# Patient Record
Sex: FEMALE | Race: WHITE | NOT HISPANIC OR LATINO | Employment: UNEMPLOYED | URBAN - METROPOLITAN AREA
[De-identification: names, ages, dates, MRNs, and addresses within clinical notes are randomized per-mention and may not be internally consistent; named-entity substitution may affect disease eponyms.]

---

## 2018-04-19 ENCOUNTER — OFFICE VISIT (OUTPATIENT)
Dept: INTERNAL MEDICINE | Facility: CLINIC | Age: 33
End: 2018-04-19
Payer: COMMERCIAL

## 2018-04-19 ENCOUNTER — LAB VISIT (OUTPATIENT)
Dept: LAB | Facility: HOSPITAL | Age: 33
End: 2018-04-19
Attending: INTERNAL MEDICINE
Payer: COMMERCIAL

## 2018-04-19 VITALS
BODY MASS INDEX: 47.72 KG/M2 | HEIGHT: 58 IN | TEMPERATURE: 97 F | OXYGEN SATURATION: 98 % | WEIGHT: 227.31 LBS | SYSTOLIC BLOOD PRESSURE: 122 MMHG | DIASTOLIC BLOOD PRESSURE: 74 MMHG | HEART RATE: 88 BPM

## 2018-04-19 DIAGNOSIS — E66.01 SEVERE OBESITY (BMI >= 40): ICD-10-CM

## 2018-04-19 DIAGNOSIS — E07.9 THYROID DYSFUNCTION: ICD-10-CM

## 2018-04-19 DIAGNOSIS — E88.819 INSULIN RESISTANCE: ICD-10-CM

## 2018-04-19 DIAGNOSIS — E55.9 VITAMIN D DEFICIENCY: ICD-10-CM

## 2018-04-19 DIAGNOSIS — E88.819 INSULIN RESISTANCE: Primary | ICD-10-CM

## 2018-04-19 LAB
25(OH)D3+25(OH)D2 SERPL-MCNC: 17 NG/ML
ALBUMIN SERPL BCP-MCNC: 3.7 G/DL
ALP SERPL-CCNC: 73 U/L
ALT SERPL W/O P-5'-P-CCNC: 55 U/L
ANION GAP SERPL CALC-SCNC: 15 MMOL/L
AST SERPL-CCNC: 40 U/L
BASOPHILS # BLD AUTO: 0.06 K/UL
BASOPHILS NFR BLD: 0.6 %
BILIRUB SERPL-MCNC: 0.3 MG/DL
BUN SERPL-MCNC: 11 MG/DL
CALCIUM SERPL-MCNC: 10 MG/DL
CHLORIDE SERPL-SCNC: 103 MMOL/L
CHOLEST SERPL-MCNC: 154 MG/DL
CHOLEST/HDLC SERPL: 3.9 {RATIO}
CO2 SERPL-SCNC: 22 MMOL/L
CREAT SERPL-MCNC: 0.7 MG/DL
DIFFERENTIAL METHOD: ABNORMAL
EOSINOPHIL # BLD AUTO: 0.1 K/UL
EOSINOPHIL NFR BLD: 1.2 %
ERYTHROCYTE [DISTWIDTH] IN BLOOD BY AUTOMATED COUNT: 15.1 %
EST. GFR  (AFRICAN AMERICAN): >60 ML/MIN/1.73 M^2
EST. GFR  (NON AFRICAN AMERICAN): >60 ML/MIN/1.73 M^2
ESTIMATED AVG GLUCOSE: 111 MG/DL
GLUCOSE SERPL-MCNC: 121 MG/DL
HBA1C MFR BLD HPLC: 5.5 %
HCT VFR BLD AUTO: 43 %
HDLC SERPL-MCNC: 39 MG/DL
HDLC SERPL: 25.3 %
HGB BLD-MCNC: 13 G/DL
IMM GRANULOCYTES # BLD AUTO: 0.16 K/UL
IMM GRANULOCYTES NFR BLD AUTO: 1.5 %
LDLC SERPL CALC-MCNC: 39.4 MG/DL
LYMPHOCYTES # BLD AUTO: 3 K/UL
LYMPHOCYTES NFR BLD: 28.5 %
MCH RBC QN AUTO: 26.5 PG
MCHC RBC AUTO-ENTMCNC: 30.2 G/DL
MCV RBC AUTO: 88 FL
MONOCYTES # BLD AUTO: 0.7 K/UL
MONOCYTES NFR BLD: 6.8 %
NEUTROPHILS # BLD AUTO: 6.4 K/UL
NEUTROPHILS NFR BLD: 61.4 %
NONHDLC SERPL-MCNC: 115 MG/DL
NRBC BLD-RTO: 0 /100 WBC
PLATELET # BLD AUTO: 324 K/UL
PMV BLD AUTO: 10.7 FL
POTASSIUM SERPL-SCNC: 4.1 MMOL/L
PROT SERPL-MCNC: 7.1 G/DL
RBC # BLD AUTO: 4.9 M/UL
SODIUM SERPL-SCNC: 140 MMOL/L
T4 FREE SERPL-MCNC: 0.69 NG/DL
TRIGL SERPL-MCNC: 378 MG/DL
TSH SERPL DL<=0.005 MIU/L-ACNC: 1.18 UIU/ML
WBC # BLD AUTO: 10.38 K/UL

## 2018-04-19 PROCEDURE — 82306 VITAMIN D 25 HYDROXY: CPT

## 2018-04-19 PROCEDURE — 99999 PR PBB SHADOW E&M-EST. PATIENT-LVL III: CPT | Mod: PBBFAC,,, | Performed by: INTERNAL MEDICINE

## 2018-04-19 PROCEDURE — 80053 COMPREHEN METABOLIC PANEL: CPT

## 2018-04-19 PROCEDURE — 99214 OFFICE O/P EST MOD 30 MIN: CPT | Mod: S$GLB,,, | Performed by: INTERNAL MEDICINE

## 2018-04-19 PROCEDURE — 36415 COLL VENOUS BLD VENIPUNCTURE: CPT | Mod: PO

## 2018-04-19 PROCEDURE — 83036 HEMOGLOBIN GLYCOSYLATED A1C: CPT

## 2018-04-19 PROCEDURE — 85025 COMPLETE CBC W/AUTO DIFF WBC: CPT

## 2018-04-19 PROCEDURE — 84439 ASSAY OF FREE THYROXINE: CPT

## 2018-04-19 PROCEDURE — 80061 LIPID PANEL: CPT

## 2018-04-19 PROCEDURE — 84443 ASSAY THYROID STIM HORMONE: CPT

## 2018-04-19 NOTE — PROGRESS NOTES
Subjective:      Patient ID: Shantelle Blount is a 32 y.o. female.    Chief Complaint: Establish Care ([facility])      Ms. Shantelle Blount is a patient of Mathieu Dalal MD, who presents to establish primary care.    HPI  She reports having abnormal thyroid function tests from [facility] as of yesterday, for which she was recommended to f/u with her PCP.     She otherwise reports feeling well.     Past Medical History:   Diagnosis Date    Anxiety     Bipolar 2 disorder     Borderline personality disorder     Depression     Insomnia     Insulin resistance      History reviewed. No pertinent surgical history.  Social History     Social History    Marital status:      Spouse name: N/A    Number of children: N/A    Years of education: N/A     Occupational History    Not on file.     Social History Main Topics    Smoking status: Never Smoker    Smokeless tobacco: Never Used    Alcohol use 0.0 - 1.2 oz/week      Comment: 1-2 drinks 1x/mo    Drug use: No    Sexual activity: Yes     Partners: Male     Birth control/ protection: IUD      Comment:      Other Topics Concern    Not on file     Social History Narrative    Patient goal(s): Trying to lose weight in general; She notes being 100 lbs in 2009 and gained weight since.        Breakfast: 28%: Fruit smoothies    Lunch: 50%: Fruit or salads: greens, onions, no dress; water or lemonade    Dinner: Crawfish or protein (not red meat); water or lemonade    Snacks: Citrus fruit: apples or cashews with non-dairy yogurt    Eating out: 2-4x/mo    Water: <32 oz/day    Physical Activity: Walks 1 mi/day (60 minutes/day; 7x/wk)         Goals     None        Family History   Problem Relation Age of Onset    Hypertension Mother     Hypertension Father     Stroke Father     Breast cancer Neg Hx     Colon cancer Neg Hx     Ovarian cancer Neg Hx        Current Outpatient Prescriptions:     metformin (GLUCOPHAGE) 1000 MG tablet, Take  "1,000 mg by mouth 2 (two) times daily with meals., Disp: , Rfl:     quetiapine (SEROQUEL) 100 MG Tab, Take 100 mg by mouth., Disp: , Rfl:     lamotrigine (LAMICTAL) 150 MG Tab, Take 150 mg by mouth once daily., Disp: , Rfl:     lithium (ESKALITH) 450 MG TbSR, Take 450 mg by mouth every 12 (twelve) hours., Disp: , Rfl:     lurasidone (LATUDA) 20 mg Tab, Take by mouth., Disp: , Rfl:     Review of patient's allergies indicates:   Allergen Reactions    Dilaudid [hydromorphone (bulk)] Rash    Percocet [oxycodone-acetaminophen] Nausea And Vomiting, Swelling and Rash    Septra [sulfamethoxazole-trimethoprim] Rash       Review of Systems   Negative    Objective:     /74 (BP Location: Left arm, Patient Position: Sitting, BP Method: Large (Automatic))   Pulse 88   Temp 96.8 °F (36 °C) (Tympanic)   Ht 4' 10" (1.473 m)   Wt 103.1 kg (227 lb 4.7 oz)   LMP 03/11/2018 (Exact Date)   SpO2 98%   BMI 47.50 kg/m²     Physical Exam  GEN: A&O fully, NAD  PSYC: Normal affect      Assessment:      1. Insulin resistance: Will check A1c.   2. Severe obesity (BMI >= 40): Discussed strategies for lifestyle modifications, including systematically increasing physical activity, water; and avoiding potatoes, sugar sweetened beverages, red/processed meats, fast food, grains and tomatoes; and increasing yogurt, whole fruits, unsalted nuts, non-starchy vegetables, cooked beans, and weight logging.   3. Vitamin D deficiency: Will check.   4. Thyroid dysfunction: Will check TFTs.       Plan:   Insulin resistance  -     Hemoglobin A1c; Future; Expected date: 04/19/2018    Severe obesity (BMI >= 40)  -     CBC auto differential; Future; Expected date: 04/19/2018  -     Comprehensive metabolic panel; Future; Expected date: 04/19/2018  -     Lipid panel; Future; Expected date: 04/19/2018    Vitamin D deficiency  -     Vitamin D; Future; Expected date: 04/19/2018    Thyroid dysfunction  -     TSH; Future; Expected date: 04/19/2018  -    "  T4, free; Future; Expected date: 04/19/2018        Follow-up in about 3 months (around 7/19/2018), or if symptoms worsen or fail to improve, for FU on obesity.

## 2018-07-05 ENCOUNTER — PATIENT OUTREACH (OUTPATIENT)
Dept: ADMINISTRATIVE | Facility: HOSPITAL | Age: 33
End: 2018-07-05

## 2019-10-29 PROBLEM — F51.01 PRIMARY INSOMNIA: Status: ACTIVE | Noted: 2019-10-29

## 2019-11-22 PROBLEM — Z20.828 EXPOSURE TO INFLUENZA: Status: ACTIVE | Noted: 2019-11-22

## 2019-11-22 PROBLEM — J40 BRONCHITIS: Status: ACTIVE | Noted: 2019-11-22

## 2019-12-21 ENCOUNTER — NURSE TRIAGE (OUTPATIENT)
Dept: ADMINISTRATIVE | Facility: CLINIC | Age: 34
End: 2019-12-21

## 2019-12-22 NOTE — TELEPHONE ENCOUNTER
Reason for Disposition   [1] Constant abdominal pain AND [2] present > 2 hours    Additional Information   Negative: Shock suspected (e.g., cold/pale/clammy skin, too weak to stand, low BP, rapid pulse)   Negative: Difficult to awaken or acting confused (e.g., disoriented, slurred speech)   Negative: Sounds like a life-threatening emergency to the triager   Negative: [1] SEVERE abdominal pain (e.g., excruciating) AND [2] present > 1 hour   Negative: [1] SEVERE abdominal pain AND [2] age > 60   Negative: [1] Blood in the stool AND [2] moderate or large amount of blood   Negative: Black or tarry bowel movements  (Exception: chronic-unchanged  black-grey bowel movements AND is taking iron pills or Pepto-bismol)   Negative: [1] Drinking very little AND [2] dehydration suspected (e.g., no urine > 12 hours, very dry mouth, very lightheaded)   Negative: Patient sounds very sick or weak to the triager   Negative: [1] SEVERE diarrhea (e.g., 7 or more times / day more than normal) AND [2]  age > 60 years    Protocols used: DIARRHEA-A-    Patient states she has diarrhea 10-12 daily, constant but mild abdominal pain, no fever. Cleocin caused severe GI upset and she has had severe diarrhea for 2 weeks. 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 Patient advised to go to the ED for evaluation and she verbalized understanding.

## 2021-04-29 ENCOUNTER — PATIENT MESSAGE (OUTPATIENT)
Dept: RESEARCH | Facility: HOSPITAL | Age: 36
End: 2021-04-29

## 2022-03-14 PROBLEM — O09.521 MULTIGRAVIDA OF ADVANCED MATERNAL AGE IN FIRST TRIMESTER: Status: ACTIVE | Noted: 2022-03-14

## 2022-04-26 PROBLEM — O26.642 CHOLESTASIS DURING PREGNANCY IN SECOND TRIMESTER: Status: ACTIVE | Noted: 2022-04-26

## 2023-01-13 ENCOUNTER — NURSE TRIAGE (OUTPATIENT)
Dept: ADMINISTRATIVE | Facility: CLINIC | Age: 38
End: 2023-01-13
Payer: COMMERCIAL

## 2023-01-14 NOTE — TELEPHONE ENCOUNTER
"Patient states she had her IUD placed the day before Thanksgiving. She took a pregnancy test on New Year's Day and it was positive. She saw her OB last week and had a blood pregnancy test that was negative. She is having sharp pain and bleeding since Saturday. Pain is below belly button on right side, 3/10. It has been constant for several hours. [facility] offered and accepted. Please contact caller directly to discuss any further care advice.    Reason for Disposition   [1] MILD-MODERATE pain AND [2] constant AND [3] present > 2 hours    Additional Information   Negative: Shock suspected (e.g., cold/pale/clammy skin, too weak to stand, low BP, rapid pulse)   Negative: Difficult to awaken or acting confused (e.g., disoriented, slurred speech)   Negative: Passed out (i.e., lost consciousness, collapsed and was not responding)   Negative: Sounds like a life-threatening emergency to the triager   Negative: [1] SEVERE pain (e.g., excruciating) AND [2] present > 1 hour   Negative: [1] SEVERE pain AND [2] age > 60 years   Negative: [1] Vomiting AND [2] contains red blood or black ("coffee ground") material  (Exception: few red streaks in vomit that only happened once)   Negative: Blood in bowel movements (Exception: blood on surface of BM with constipation)   Negative: Black or tarry bowel movements (Exception: chronic-unchanged black-grey bowel movements AND is taking iron pills or Pepto-bismol)   Negative: Patient sounds very sick or weak to the triager    Protocols used: Abdominal Pain - Female-A-AH    "

## 2023-01-24 PROBLEM — F31.9 BIPOLAR DISORDER: Status: ACTIVE | Noted: 2023-01-24

## 2023-01-24 PROBLEM — G44.1 VASCULAR HEADACHE, NOT ELSEWHERE CLASSIFIED: Status: ACTIVE | Noted: 2022-11-20

## 2023-01-24 PROBLEM — N91.5 OLIGOMENORRHEA: Status: ACTIVE | Noted: 2023-01-24

## 2023-01-24 PROBLEM — F41.9 ANXIETY: Status: ACTIVE | Noted: 2023-01-24

## 2023-01-24 PROBLEM — E88.810 METABOLIC SYNDROME: Status: ACTIVE | Noted: 2023-01-24

## 2024-06-13 ENCOUNTER — OFFICE VISIT (OUTPATIENT)
Dept: OBSTETRICS AND GYNECOLOGY | Facility: CLINIC | Age: 39
End: 2024-06-13
Payer: COMMERCIAL

## 2024-06-13 VITALS
BODY MASS INDEX: 45.37 KG/M2 | WEIGHT: 210.31 LBS | DIASTOLIC BLOOD PRESSURE: 93 MMHG | HEIGHT: 57 IN | SYSTOLIC BLOOD PRESSURE: 129 MMHG

## 2024-06-13 DIAGNOSIS — E28.2 PCOS (POLYCYSTIC OVARIAN SYNDROME): Primary | ICD-10-CM

## 2024-06-13 DIAGNOSIS — R10.30 LOWER ABDOMINAL PAIN: ICD-10-CM

## 2024-06-13 LAB
B-HCG UR QL: NEGATIVE
BILIRUBIN, UA POC OHS: NEGATIVE
BLOOD, UA POC OHS: NEGATIVE
CLARITY, UA POC OHS: CLEAR
COLOR, UA POC OHS: YELLOW
CTP QC/QA: YES
GLUCOSE, UA POC OHS: NEGATIVE
KETONES, UA POC OHS: NEGATIVE
LEUKOCYTES, UA POC OHS: NEGATIVE
NITRITE, UA POC OHS: NEGATIVE
PH, UA POC OHS: 6
PROTEIN, UA POC OHS: NEGATIVE
SPECIFIC GRAVITY, UA POC OHS: >=1.03
UROBILINOGEN, UA POC OHS: 0.2

## 2024-06-13 PROCEDURE — 81002 URINALYSIS NONAUTO W/O SCOPE: CPT | Mod: S$GLB,,, | Performed by: OBSTETRICS & GYNECOLOGY

## 2024-06-13 PROCEDURE — 99999 PR PBB SHADOW E&M-EST. PATIENT-LVL III: CPT | Mod: PBBFAC,,, | Performed by: OBSTETRICS & GYNECOLOGY

## 2024-06-13 PROCEDURE — 81025 URINE PREGNANCY TEST: CPT | Mod: S$GLB,,, | Performed by: OBSTETRICS & GYNECOLOGY

## 2024-06-13 PROCEDURE — 99204 OFFICE O/P NEW MOD 45 MIN: CPT | Mod: 25,S$GLB,, | Performed by: OBSTETRICS & GYNECOLOGY

## 2024-06-13 RX ORDER — NORETHINDRONE 0.35 MG/1
1 TABLET ORAL DAILY
Qty: 28 TABLET | Refills: 11 | Status: SHIPPED | OUTPATIENT
Start: 2024-06-13 | End: 2025-06-13

## 2024-06-13 NOTE — PROGRESS NOTES
Subjective     Patient ID: Shantelle Blount is a 39 y.o. female.    Chief Complaint:  severe pain (PCOS)      History of Present Illness  HPI  Dysfunctional Uterine Bleeding  Patient is here for a second opinion.  Pt has been seeing Dr. Matthew Morin for PCOS and most recently saw Dr. Melgar at [place] for irregular/heavy menses and severe abdominal pains at time of menses.  She had been bleeding irregularly. She is now bleeding every 28 days and menses are lasting  14  days. Dysmenorrhea:severe, occurring throughout cycle. Cyclic symptoms include: pelvic pain. Current contraception: vasectomy.  Last pap NILM in .  Pt reports that she frequently has ruptured ovarian cysts that cause pain then the pains resolve with hours to days.  Most recent episodes have lasted weeks and prompted an ER visit in May.  Pt then followed up with Dr. Melgar.  Pt states that she was advised to proceed with low dose OCP and that hysterectomy with BSO would be necessary if her symptoms did not improve.  Pt is concerned about this recommendation as it seemed too much for her.  Would like another opinion.    GYN & OB History  Patient's last menstrual period was 2024 (exact date).   Date of Last Pap: 2017    OB History    Para Term  AB Living   4 2 2   1 2   SAB IAB Ectopic Multiple Live Births   1       2      # Outcome Date GA Lbr Benjamin/2nd Weight Sex Type Anes PTL Lv   4             3 Term 10/10/22   2.807 kg (6 lb 3 oz) F Vag-Spont EPI N JAYCE      Birth Comments: IDGDM,PURITIS OF PREGNANCY   2 Term    4.082 kg (9 lb) M Vag-Spont   JAYCE      Birth Comments: pphemorrhage required transfusion   1 SAB                Review of Systems  Review of Systems   Constitutional:  Negative for activity change, appetite change, chills, fatigue, fever and unexpected weight change.   Respiratory:  Negative for shortness of breath.    Cardiovascular:  Negative for chest pain, palpitations and leg swelling.    Gastrointestinal:  Positive for abdominal pain. Negative for bloating, blood in stool, constipation, diarrhea, nausea and vomiting.   Genitourinary:  Positive for dysmenorrhea, menorrhagia, menstrual problem and pelvic pain. Negative for dyspareunia, dysuria, flank pain, frequency, genital sores, hematuria, urgency, vaginal bleeding, vaginal discharge, vaginal pain, urinary incontinence, postcoital bleeding, vaginal dryness and vaginal odor.   Musculoskeletal:  Negative for back pain.   Integumentary:  Negative for breast mass, nipple discharge, breast skin changes and breast tenderness.   Neurological:  Negative for syncope and headaches.   Breast: Negative for asymmetry, lump, mass, mastodynia, nipple discharge, skin changes and tenderness         Objective   Physical Exam:   Constitutional: She is oriented to person, place, and time. She appears well-developed and well-nourished. No distress.    HENT:   Head: Normocephalic and atraumatic.    Eyes: Pupils are equal, round, and reactive to light. EOM are normal.     Cardiovascular:  Normal rate, regular rhythm and normal heart sounds.             Pulmonary/Chest: Effort normal and breath sounds normal.        Abdominal: Soft. Bowel sounds are normal. She exhibits no distension. There is no abdominal tenderness.     Genitourinary:    Vagina, uterus, right adnexa and left adnexa normal.      Pelvic exam was performed with patient supine.   There is no rash, tenderness, lesion or injury on the right labia. There is no rash, tenderness, lesion or injury on the left labia. Cervix is normal. Right adnexum displays no mass, no tenderness and no fullness. Left adnexum displays no mass, no tenderness and no fullness. No erythema, vaginal discharge, tenderness or bleeding in the vagina.    No foreign body in the vagina.      No signs of injury in the vagina.   Cervix exhibits no motion tenderness, no discharge and no friability. Uterus is not deviated, not enlarged and not  tender.    Genitourinary Comments: UPT today Negative  UA today Negative             Musculoskeletal: Normal range of motion and moves all extremeties. No tenderness or edema.       Neurological: She is alert and oriented to person, place, and time.    Skin: Skin is warm and dry.    Psychiatric: She has a normal mood and affect. Her behavior is normal. Thought content normal.            Assessment and Plan     1. PCOS (polycystic ovarian syndrome)    2. Lower abdominal pain             Plan:  PCOS (polycystic ovarian syndrome)  -     norethindrone (MICRONOR) 0.35 mg tablet; Take 1 tablet (0.35 mg total) by mouth once daily.  Dispense: 28 tablet; Refill: 11  -     Agree with prior diagnosis of PCOS. Pt was counseled on chronic anovulation/PCOS, including common signs/symptoms and the many factors that influence it.  Pt was also counseled on available treatment options, including the associated risks and benefits of each.  Recommend trial with Micronor first as prior trials with combination OCP have resulted in significant side-effects.  Pt voiced understanding and desires to proceed with Micronor.  Medication dosing, side-effects, risks, benefits, and alternatives were discussed.  Medical history was reviewed and pt is a candidate for Micronor use.    Lower abdominal pain  -     POCT Urinalysis(Instrument)  -     POCT urine pregnancy  -     Work-up to date at [facility] and  was reviewed and appears to be thorough and complete.  Overall findings are unremarkable and etiology of pains remains uncertain.  Possible Gyn and Non-Gyn etiologies were discussed.  Recommend observing progression of symptoms on Micronor.  If symptoms worsen or fail to improve, would advise Dx. [place]/[facility].      Follow up in about 3 months (around 9/13/2024).     I spent a total of 45 minutes on the day of the visit.This includes face to face time and on-face to face time preparing to see the patient (eg, review of tests), Obtaining and/or  reviewing separately obtained history, Documenting clinical information in the electronic or other health record, Independently interpreting resultsand communicating results to the patient/family/caregiver, or Care coordination.